# Patient Record
Sex: FEMALE | Race: WHITE
[De-identification: names, ages, dates, MRNs, and addresses within clinical notes are randomized per-mention and may not be internally consistent; named-entity substitution may affect disease eponyms.]

---

## 2017-01-24 ENCOUNTER — HOSPITAL ENCOUNTER (INPATIENT)
Dept: HOSPITAL 17 - H2EB | Age: 32
LOS: 9 days | Discharge: HOME | End: 2017-02-02
Attending: OBSTETRICS & GYNECOLOGY | Admitting: OBSTETRICS & GYNECOLOGY
Payer: MEDICAID

## 2017-01-24 VITALS — HEIGHT: 62 IN | BODY MASS INDEX: 37.73 KG/M2 | WEIGHT: 205 LBS

## 2017-01-24 DIAGNOSIS — Z3A.39: ICD-10-CM

## 2017-01-24 DIAGNOSIS — O34.219: Primary | ICD-10-CM

## 2017-01-24 DIAGNOSIS — D64.9: ICD-10-CM

## 2017-01-24 PROCEDURE — 81001 URINALYSIS AUTO W/SCOPE: CPT

## 2017-01-24 PROCEDURE — 85025 COMPLETE CBC W/AUTO DIFF WBC: CPT

## 2017-01-24 PROCEDURE — 59025 FETAL NON-STRESS TEST: CPT

## 2017-01-24 PROCEDURE — 86850 RBC ANTIBODY SCREEN: CPT

## 2017-01-24 PROCEDURE — 90715 TDAP VACCINE 7 YRS/> IM: CPT

## 2017-01-24 PROCEDURE — 86900 BLOOD TYPING SEROLOGIC ABO: CPT

## 2017-01-24 PROCEDURE — 87086 URINE CULTURE/COLONY COUNT: CPT

## 2017-01-24 PROCEDURE — 86901 BLOOD TYPING SEROLOGIC RH(D): CPT

## 2017-01-31 VITALS — SYSTOLIC BLOOD PRESSURE: 129 MMHG | DIASTOLIC BLOOD PRESSURE: 71 MMHG | HEART RATE: 93 BPM

## 2017-01-31 VITALS
DIASTOLIC BLOOD PRESSURE: 73 MMHG | OXYGEN SATURATION: 99 % | HEART RATE: 95 BPM | TEMPERATURE: 97.9 F | RESPIRATION RATE: 18 BRPM | SYSTOLIC BLOOD PRESSURE: 127 MMHG

## 2017-01-31 VITALS — SYSTOLIC BLOOD PRESSURE: 116 MMHG | DIASTOLIC BLOOD PRESSURE: 71 MMHG | HEART RATE: 100 BPM | OXYGEN SATURATION: 97 %

## 2017-01-31 VITALS
OXYGEN SATURATION: 98 % | HEART RATE: 109 BPM | TEMPERATURE: 97.7 F | DIASTOLIC BLOOD PRESSURE: 68 MMHG | RESPIRATION RATE: 17 BRPM | SYSTOLIC BLOOD PRESSURE: 127 MMHG

## 2017-01-31 VITALS
RESPIRATION RATE: 16 BRPM | SYSTOLIC BLOOD PRESSURE: 125 MMHG | DIASTOLIC BLOOD PRESSURE: 66 MMHG | HEART RATE: 85 BPM | TEMPERATURE: 98.1 F

## 2017-01-31 VITALS
RESPIRATION RATE: 18 BRPM | DIASTOLIC BLOOD PRESSURE: 67 MMHG | SYSTOLIC BLOOD PRESSURE: 124 MMHG | OXYGEN SATURATION: 96 % | HEART RATE: 100 BPM

## 2017-01-31 LAB
BACTERIA #/AREA URNS HPF: (no result) /HPF
BASOPHILS # BLD AUTO: 0 TH/MM3 (ref 0–0.2)
BASOPHILS NFR BLD: 0.3 % (ref 0–2)
COLOR UR: YELLOW
COMMENT (UR): (no result)
CULTURE IF INDICATED: (no result)
EOSINOPHIL # BLD: 0.1 TH/MM3 (ref 0–0.4)
EOSINOPHIL NFR BLD: 0.9 % (ref 0–4)
ERYTHROCYTE [DISTWIDTH] IN BLOOD BY AUTOMATED COUNT: 14.7 % (ref 11.6–17.2)
GLUCOSE UR STRIP-MCNC: (no result) MG/DL
HCT VFR BLD CALC: 37.1 % (ref 35–46)
HEMO FLAGS: (no result)
HGB UR QL STRIP: (no result)
HYALINE CASTS #/AREA URNS LPF: 1 /LPF
KETONES UR STRIP-MCNC: (no result) MG/DL
LYMPHOCYTES # BLD AUTO: 2.3 TH/MM3 (ref 1–4.8)
LYMPHOCYTES NFR BLD AUTO: 19.8 % (ref 9–44)
MCH RBC QN AUTO: 26.8 PG (ref 27–34)
MCHC RBC AUTO-ENTMCNC: 32.5 % (ref 32–36)
MCV RBC AUTO: 82.5 FL (ref 80–100)
MONOCYTES NFR BLD: 6.5 % (ref 0–8)
MUCOUS THREADS #/AREA URNS LPF: (no result) /LPF
NEUTROPHILS # BLD AUTO: 8.5 TH/MM3 (ref 1.8–7.7)
NEUTROPHILS NFR BLD AUTO: 72.5 % (ref 16–70)
NITRITE UR QL STRIP: (no result)
PLATELET # BLD: 284 TH/MM3 (ref 150–450)
RBC # BLD AUTO: 4.5 MIL/MM3 (ref 4–5.3)
SP GR UR STRIP: 1.02 (ref 1–1.03)
SQUAMOUS #/AREA URNS HPF: 4 /HPF (ref 0–5)
WBC # BLD AUTO: 11.7 TH/MM3 (ref 4–11)

## 2017-01-31 RX ADMIN — SODIUM CHLORIDE, PRESERVATIVE FREE SCH ML: 5 INJECTION INTRAVENOUS at 20:25

## 2017-01-31 NOTE — MH
cc:

AJ JUAREZ

****

 

DATE OF ADMISSION

2017

 

REASON FOR ADMISSION

1. Intrauterine pregnancy at 39 weeks.

2. History of  deliveries x 2

 

HISTORY OF PRESENT ILLNESS

Ms. Casillas is a 32-year-old female para 2-0-3-2 who is at 39 weeks plus today

and is being brought in for a repeat  section.  She is 39 weeks by

early ultrasound and dates and she has been followed in the office without any

significant problems.  She did have a history of oligohydramnios in previous

pregnancies and we had followed that.  She understands the risks and the

benefits of the procedure and has agreed to proceed.

 

PAST OB HISTORY

She is para 2-0-3-2.  She has had two  sections at term, two SABs and

one TAB.

 

PAST GYN HISTORY

Negative.

 

PAST SURGICAL HISTORY

She has had  sections x 2.

 

PAST MEDICAL HISTORY

History of oligohydramnios.

 

SOCIAL HISTORY

She is single.  She does not smoke, drink or take drugs.

 

FAMILY HISTORY

Remarkable for melanotic colon cancer, high cholesterol, high blood pressure,

depression, diabetes and Alzheimer's.

 

CURRENT MEDICATIONS

Prenatal vitamins.

 

ALLERGIES

No known drug allergies.

 

 

REVIEW OF SYSTEMS

No headaches, no shortness of breath, no chest pain or pressure.  The baby is

moving well.  She denies any rupture of membranes or bleeding.

 

PHYSICAL EXAMINATION

GENERAL:  A well-developed, well-nourished female in no acute distress, resting

comfortably.

VITAL SIGNS:  Blood pressure is 120/78.  Her weight is 205.

HEENT:  Normocephalic, atraumatic.

NECK:  Supple.  Trachea is in the midline.  No thyromegaly or adenopathy.

CHEST:  Clear to auscultation.

HEART:  Regular rate and rhythm, without murmur or gallop.

ABDOMEN:  Gravid, soft, nontender.  The fundus is nontender.  The baby feels to

be in vertex presentation.

PELVIC EXAM:  Deferred.

EXTREMITIES:  There is no clubbing or cyanosis.  There is a little bit of 1+

pitting edema in the lower extremities.

 

ASSESSMENT AND PLAN

1. Intrauterine pregnancy at 39+ weeks with a previous  section x 2.

     We will go ahead and repeat that.  We will go ahead and give her

     prophylactic antibiotics for that as well.

2. History of oligohydramnios.  We will follow this.

 

 

                               __________________________________

                           R. Aj Juarez MD

 

 

 

 

RJV/SSB

D:  2017/10:12 AM

T:  2017/10:31 AM

Visit #:  [id number]

Job #:  [id number]

## 2017-02-01 LAB
BASOPHILS # BLD AUTO: 0 TH/MM3 (ref 0–0.2)
BASOPHILS NFR BLD: 0.2 % (ref 0–2)
EOSINOPHIL # BLD: 0 TH/MM3 (ref 0–0.4)
EOSINOPHIL NFR BLD: 0.5 % (ref 0–4)
ERYTHROCYTE [DISTWIDTH] IN BLOOD BY AUTOMATED COUNT: 14.9 % (ref 11.6–17.2)
HCT VFR BLD CALC: 28.8 % (ref 35–46)
HEMO FLAGS: (no result)
LYMPHOCYTES # BLD AUTO: 1.3 TH/MM3 (ref 1–4.8)
LYMPHOCYTES NFR BLD AUTO: 13.7 % (ref 9–44)
MCH RBC QN AUTO: 27.4 PG (ref 27–34)
MCHC RBC AUTO-ENTMCNC: 33.2 % (ref 32–36)
MCV RBC AUTO: 82.7 FL (ref 80–100)
MONOCYTES NFR BLD: 7 % (ref 0–8)
NEUTROPHILS # BLD AUTO: 7.7 TH/MM3 (ref 1.8–7.7)
NEUTROPHILS NFR BLD AUTO: 78.6 % (ref 16–70)
PLATELET # BLD: 226 TH/MM3 (ref 150–450)
RBC # BLD AUTO: 3.48 MIL/MM3 (ref 4–5.3)
WBC # BLD AUTO: 9.8 TH/MM3 (ref 4–11)

## 2017-02-01 RX ADMIN — IBUPROFEN PRN MG: 600 TABLET, FILM COATED ORAL at 13:30

## 2017-02-01 RX ADMIN — OXYCODONE HYDROCHLORIDE AND ACETAMINOPHEN PRN TAB: 5; 325 TABLET ORAL at 19:24

## 2017-02-01 RX ADMIN — OXYCODONE HYDROCHLORIDE AND ACETAMINOPHEN PRN TAB: 5; 325 TABLET ORAL at 08:27

## 2017-02-01 RX ADMIN — STANDARDIZED SENNA CONCENTRATE AND DOCUSATE SODIUM PRN TAB: 8.6; 5 TABLET, FILM COATED ORAL at 13:30

## 2017-02-01 RX ADMIN — OXYCODONE HYDROCHLORIDE AND ACETAMINOPHEN PRN TAB: 5; 325 TABLET ORAL at 04:00

## 2017-02-01 RX ADMIN — IBUPROFEN PRN MG: 600 TABLET, FILM COATED ORAL at 19:25

## 2017-02-01 RX ADMIN — OXYCODONE HYDROCHLORIDE AND ACETAMINOPHEN PRN TAB: 5; 325 TABLET ORAL at 13:30

## 2017-02-01 NOTE — HHI.OB
Subjective


Post Operative Day:  1





Objective


Vitals/I&O





Vital Signs








  Date Time  Temp Pulse Resp B/P Pulse Ox O2 Delivery O2 Flow Rate FiO2


 


17 14:50  95  127/73    


 


17 14:34    124/67    


 


17 14:34  100 18  96   


 


17 14:25  100  116/71 97   


 


17 13:55 97.7 109 17 127/68 98   








Result Diagram:  


17 0519





Objective Remarks


GENERAL: Well-nourished, well-developed patient.


CARDIOVASCULAR: Regular rate and rhythm without murmurs, gallops, or rubs. 


RESPIRATORY: Breath sounds equal bilaterally. No accessory muscle use.


ABDOMEN/GI: Abdomen soft, non-tender, bowel sounds present. 


   Incision: dressing, Clean, dry and intact.


   Fundus: Firm, non-tender at umbilicus.


GENITOURINARY: Light to moderate bleeding.


EXTREMITIES: No cyanosis or edema, non-tender, without signs of DVT.


Medications and IVs





 Current Medications








 Medications


  (Trade)  Dose


 Ordered  Sig/Dariana


 Route  Start Time


 Stop Time Status Last Admin


 


  (Lr 1000 ml Inj)  1,000 ml @ 


 100 mls/hr  Q10H


 IV  17 22:04


 17 18:03  17 20:27


 


 


  (NS Flush)  2 ml  BID


 IV  17 21:00


     


 


 


  (NS Flush)  2 ml  UNSCH  PRN


 IV  17 17:15


     


 


 


  (Mylicon Chew)  80 mg  QID  PRN


 PO  17 17:15


     


 


 


  (Percocet  5-325


 Mg)  1 tab  Q4H  PRN


 PO  17 17:15


    17 08:27


 


 


  (Percocet  5-325


 Mg)  2 tab  Q4H  PRN


 PO  17 17:15


     


 


 


  (Angeles-Colace)  2 tab  Q12H  PRN


 PO  17 17:15


     


 


 


  (Ambien)  5 mg  HS  PRN


 PO  17 17:15


     


 


 


  (M-M-R Ii Inj)  0.5 ml  ONCE ONCE


 SQ  17 16:00


 17 16:01   


 


 


  (Boostrix Inj)  0.5 ml  ONCE ONCE


 IM  17 16:00


 17 16:01   


 


 


  (Zofran Inj)  4 mg  Q6H  PRN


 IV PUSH  17 17:15


    17 17:46


 











Assessment/Plan


Problem List:  


(1) Anemia


Plan:  treat pp





(2) S/P repeat low transverse 


Plan:  routine





Assessment and Plan


POD #1


pt doing well


will shower today and remove dressing


added motrin to oral pain meds


routine post op care


Discharge Planning


consider dc in 1-2 days








Silvana Mendez 2017 10:20

## 2017-02-01 NOTE — HHI.DCPOC
Discharge Care Plan


Diagnosis:  


(1) S/P repeat low transverse 


(2) Anemia


Your Health Problems Are:      delivery


Report Symptoms to Your Doctor


-Temperate above 100.5 degrees


-Redness, of incision or excessive or foul smelling drainage


-Unusual pain or calf pain


-Increased vaginal bleeding


-Painful or difficulty urinating


-Feelings of extreme sadness or anxiety after 2 weeks


Goals to Promote Your Health


* To prevent worsening of your condition and complications


* To maintain your health at the optimal level


Directions to Meet Your Goals


*** Take your medications as prescribed


*** Follow your dietary instruction


*** Follow activity as directed


*** Ensure plenty of rest for recovery


*** Drink fluids for hydration








*** Keep your appointments as scheduled


*** Take your immunizations and boosters as scheduled


*** If your symptoms worsen call your PCP, if no PCP go to Urgent Care Center 

or Emergency Room***


*** Smoking is Dangerous to Your Health. Avoid second hand smoke***


***Call the 24-hour crisis hotline for domestic abuse at 1-378.348.7981***








Silvana Mendez 2017 10:21

## 2017-02-02 RX ADMIN — IBUPROFEN PRN MG: 600 TABLET, FILM COATED ORAL at 04:05

## 2017-02-02 RX ADMIN — OXYCODONE HYDROCHLORIDE AND ACETAMINOPHEN PRN TAB: 5; 325 TABLET ORAL at 08:54

## 2017-02-02 RX ADMIN — SODIUM CHLORIDE, PRESERVATIVE FREE SCH ML: 5 INJECTION INTRAVENOUS at 08:56

## 2017-02-02 RX ADMIN — OXYCODONE HYDROCHLORIDE AND ACETAMINOPHEN PRN TAB: 5; 325 TABLET ORAL at 04:05

## 2017-02-02 RX ADMIN — STANDARDIZED SENNA CONCENTRATE AND DOCUSATE SODIUM PRN TAB: 8.6; 5 TABLET, FILM COATED ORAL at 04:05

## 2017-02-02 RX ADMIN — IBUPROFEN PRN MG: 600 TABLET, FILM COATED ORAL at 13:32

## 2017-02-02 NOTE — HHI.DS
Admission Date


2017 at 09:53


Discharge Date:  2017


Admitting Diagnosis


39 weeks


previous c section x 2


Diagnosis:  


(1) Anemia


Diagnosis:  Secondary





(2) S/P repeat low transverse 


Diagnosis:  Principal





Delivery Date:  2017


:  Repeat


 Reason:  


previous c section x 2


Brief History


39 week pregnancy


previous c setion


repeat c section


Hospital Course


routine


Pt Condition on Discharge:  Good


Discharge Disposition:  Discharge Home


Discharge Instructions


Diet Instructions:  As Tolerated, No Restrictions


Activities You Can Perform:  Shower Only-No Bath, Pelvic Rest


Activities to Avoid:  Strenuous Activity, Sexual Activity


Follow up Referrals:  


OB/GYN - 1 Week





New Medications:  


Ibuprofen (Ibuprofen) 600 Mg Tab


600 MG PO Q6H PRN pain #30 TAB


Oxycodone-Acetaminophen (Oxycodone-Acetaminophen) 5-325 mg Tab


1 TAB PO Q4H PRN moderate pain #30 TAB


 


Continued Medications:  


Prenatal Vit W/ Ferrous Fumara (Prenatal)  Tab


1 PO











Silvana Mendez 2017 14:26

## 2017-02-03 NOTE — MP
cc:

VITOR JUAREZ MD

****

 

 

DATE OF SURGERY:

2017

 

PREOPERATIVE DIAGNOSIS:

Intrauterine pregnancy at 39 weeks.

Previous  section times two for repeat.

 

POSTOPERATIVE DIAGNOSIS:

Intrauterine pregnancy at 39 weeks.

Previous  section times two for repeat.

 

OPERATION:

Repeat low transverse  section.

 

ANESTHESIA:

Spinal.

 

SURGEON:

VITOR Juarez MD.

 

FINDINGS:

Normal male infant with a weight of 8 lbs, 9 ounces, Apgar's 8 and 9, normal

tubes, normal ovaries, normal cul-de-sacs.

 

COUNTS:

Correct.

 

ESTIMATED BLOOD LOSS

600 cc.

 

FLUIDS/CRYSTALLOIDS:

The patient tolerated the procedure well and went to the Recovery Room in good

condition. The placenta could not be donated because of the  traveling

to the [place] infested area around [place].

 

CONDITION:

The patient tolerated the procedure well and went to the recovery room in

satisfactory condition.

 

PROCEDURE:

The patient was taken to the operating room identified by name band and

verbally.  She was given a spinal anesthetic.  A Lyons catheter was inserted.

She was prepped and draped for  section.

 

The old Pfannenstiel incision was removed and excised completely and the

incision was taken down to the fascia.  The fascia was taken off the rectus

muscle by blunt and sharp dissection.  The peritoneum was entered under direct

vision without complication.

The incision was extended with care to avoid the urinary bladder. A  bladder

blade was placed and a bladder flap created over the lower uterine segment

which was well-developed.  The uterus was then scored in a transverse manner

along the lower uterine segment and taken down in the midline until the uterine

cavity was entered.  The incision was extended with the surgeon's fingers.

 

The fetal vertex was grasped and delivered through the incision without

difficulty.  The hypopharynx and nasopharynx were suctioned.  The remainder of

the infant was delivered.  The cord was doubly clamped and cut and the infant

handed to the  resuscitation team that was present.  Cord blood was

obtained.  The placenta was delivered manually without difficulty.

 

The uterus was curettaged twice with a wet lap.  The uterine incision was

repaired with 2-0 Vicryl a running locking fashion, the second layer

imbricating the first.  The cul-de-sac and gutters were cleaned of blood and

debris with a large amount of irrigation.  The uterus was delivered back into

the abdomen.

 

The incision was again inspected and was hemostatic.  The rectus muscles were

reapproximated with 0 Vicryl in an interrupted fashion.  The fascia was

repaired with 0 Vicryl from lateral to midline bilaterally in a running

fashion.  The subcutaneous tissue was repaired with 3-0 Vicryl.  The skin was

repaired with 4-0 Monocryl in a subcuticular manner.  Steri-Strips were

applied.  The wound was sterilely dressed.

 

She tolerated the procedure well and went to the recovery room in good

condition.

 

                              _________________________________

                              R. MD ASTER Drummond/sandip

D:  2017/1:52 PM

T:  2/3/2017/3:32 PM

Visit #:  [id number]

Job #:  [id number]

## 2018-04-19 ENCOUNTER — HOSPITAL ENCOUNTER (OUTPATIENT)
Dept: HOSPITAL 17 - CPRE | Age: 33
End: 2018-04-19
Attending: OBSTETRICS & GYNECOLOGY
Payer: COMMERCIAL

## 2018-04-19 DIAGNOSIS — Z01.812: Primary | ICD-10-CM

## 2018-04-19 LAB
ERYTHROCYTE [DISTWIDTH] IN BLOOD BY AUTOMATED COUNT: 13.5 % (ref 11.6–17.2)
HCT VFR BLD CALC: 41.9 % (ref 35–46)
HGB BLD-MCNC: 13.6 GM/DL (ref 11.6–15.3)
MCH RBC QN AUTO: 27.9 PG (ref 27–34)
MCHC RBC AUTO-ENTMCNC: 32.4 % (ref 32–36)
MCV RBC AUTO: 85.9 FL (ref 80–100)
PLATELET # BLD: 435 TH/MM3 (ref 150–450)
PMV BLD AUTO: 8 FL (ref 7–11)
RBC # BLD AUTO: 4.88 MIL/MM3 (ref 4–5.3)
WBC # BLD AUTO: 12.5 TH/MM3 (ref 4–11)

## 2018-04-19 PROCEDURE — 85027 COMPLETE CBC AUTOMATED: CPT

## 2018-04-19 PROCEDURE — 84703 CHORIONIC GONADOTROPIN ASSAY: CPT

## 2018-04-19 PROCEDURE — 36415 COLL VENOUS BLD VENIPUNCTURE: CPT

## 2018-04-25 ENCOUNTER — HOSPITAL ENCOUNTER (OUTPATIENT)
Dept: HOSPITAL 17 - HSDC | Age: 33
Discharge: HOME | End: 2018-04-25
Attending: OBSTETRICS & GYNECOLOGY
Payer: MEDICAID

## 2018-04-25 VITALS
OXYGEN SATURATION: 100 % | SYSTOLIC BLOOD PRESSURE: 113 MMHG | HEART RATE: 74 BPM | RESPIRATION RATE: 18 BRPM | TEMPERATURE: 98.4 F | DIASTOLIC BLOOD PRESSURE: 69 MMHG

## 2018-04-25 VITALS — WEIGHT: 164.02 LBS | BODY MASS INDEX: 30.18 KG/M2 | HEIGHT: 62 IN

## 2018-04-25 DIAGNOSIS — Z30.2: Primary | ICD-10-CM

## 2018-04-25 DIAGNOSIS — N83.02: ICD-10-CM

## 2018-04-25 DIAGNOSIS — N73.6: ICD-10-CM

## 2018-04-25 PROCEDURE — 00840 ANES IPER PX LOWER ABD NOS: CPT

## 2018-04-25 PROCEDURE — 58615 OCCLUDE FALLOPIAN TUBE(S): CPT

## 2018-04-25 PROCEDURE — 49322 LAPAROSCOPY ASPIRATION: CPT

## 2018-04-25 NOTE — MP
cc:

VITOR Juarez MD

****

 

 

DATE OF OPERATION:

04/25/2018

 

DATE OF OPERATION:

04/25/2018

 

PREOPERATIVE DIAGNOSIS:

Desires permanent sterilization.

 

POSTOPERATIVE DIAGNOSES:

1.  Desires permanent sterilization.

2.  Pelvic adhesions.

3.  Right ovarian cyst.

 

PROCEDURE PERFORMED:

Examination under anesthesia, laparoscopic exam with bilateral tubal 

ligation, aspiration of right ovarian cyst and lysis of adhesions.

 

ANESTHESIA:

General endotracheal intubation.

 

SURGEON:

KEITH Kidd

 

FINDINGS:

On examination under anesthesia, vagina was clean.  The cervix was 

clean.  The uterus normal size, shape and consistency and mobile.  The

adnexa were negative for masses.  Laparoscopic exam revealed normal 

uterus, normal fallopian tubes, normal in length and caliber.  Normal 

left ovary.  The right ovary had a 2.4 x 2.5 simple follicular cyst.  

There was scar tissue of the omentum to the anterior abdominal wall in

the left lower quadrant and also in the right periumbilical area.

 

COMPLICATIONS:

We were unable to remove the tubes, because we were unable to put the 

third port because of the adhesions.  The counts were correct.  

Estimated blood loss was minimal.  The patient tolerated the procedure

well, went to the recovery room in good condition.

 

DESCRIPTION OF PROCEDURE:

The patient was taken to the operating room, identified by name band 

and verbally, given a general anesthetic, prepped and draped in the 

dorsal lithotomy position for laparoscopic surgery.  Her bladder was 

drained with an in and out catheter and the timeout was taken.  An 

examination under anesthesia with the above findings was done.  A 

speculum was placed in the vagina.  The anterior lip of the cervix was

grasped with a single-tooth tenaculum.  The cervix was then cannulated

with the Hulka clamp, and attention was turned to the subumbilical 

area.  A small subumbilical incision was made and using the 5 mm 

trocar, we entered the abdomen under direct vision without difficulty.

 There were some adhesions around that area.  There were adhesions to 

the left lower quadrant and the right periumbilical area.  Once a 

pneumoperitoneum was created and we could clearly see the adhesive 

disease, we decided to put 1 trocar in the right lower quadrant below 

the adhesive disease.  This was done under direct vision without 

difficulty.  On the left side, we were going to place a port; however,

we did not think it was safe because of the adhesions in this area, 

which at the end of the case, we were able to take down.  The first 

order of business was to tie the fallopian tubes.  We cauterized in 3 

portions in the midportion with the Harmonic scalpel and came behind 

that with a Kleppinger forceps in the midportion of each tube.  Once 

this had been accomplished, we attempted to take the adhesions down on

the left lower quadrant and we were successful in doing that with the 

Harmonic scalpel.  There was a small piece of endometriosis, possibly 

on the left ovary.  We attempted to biopsy this several times but with

just 2 ports, we were unable to do that and we elected just to 

cauterize that.  The right ovary had a 2 x 3 cm ovarian cyst and using

the Harmonic scalpel as a drill, we drilled into it and we saw clear 

fluid.  It looked like a simple follicular cyst.  Bleeding was minimal

with this as well.  We irrigated the pelvis with some irrigation and 

removed the laparoscope under direct vision and the air was released 

through the second puncture.  She tolerated the procedure well.  We 

fixed the incisions with 4-0 Monocryl in a subcuticular manner and she

was taken to the recovery room in satisfactory condition.

 

 

__________________________________

R. MD ASTER Drummond/KOBY

D: 04/25/2018, 02:50 PM

T: 04/25/2018, 03:17 PM

Visit #: [id number]

Job #: [id number]

## 2023-09-19 NOTE — HHI.OB
Subjective


Post Operative Day:  2





Objective


Result Diagram:  


17 0519





Objective Remarks


GENERAL: Well-nourished, well-developed patient.


CARDIOVASCULAR: Regular rate and rhythm without murmurs, gallops, or rubs. 


RESPIRATORY: Breath sounds equal bilaterally. No accessory muscle use.


ABDOMEN/GI: Abdomen soft, non-tender, bowel sounds present. 


   Incision: , Clean, dry and intact.


   Fundus: Firm, non-tender at umbilicus.


GENITOURINARY: Light to moderate bleeding.


EXTREMITIES: No cyanosis or edema, non-tender, without signs of DVT.


Medications and IVs





 Current Medications








 Medications


  (Trade)  Dose


 Ordered  Sig/Dariana


 Route  Start Time


 Stop Time Status Last Admin


 


  (NS Flush)  2 ml  BID


 IV  17 21:00


     


 


 


  (NS Flush)  2 ml  UNSCH  PRN


 IV  17 17:15


     


 


 


  (Mylicon Chew)  80 mg  QID  PRN


 PO  17 17:15


     


 


 


  (Percocet  5-325


 Mg)  1 tab  Q4H  PRN


 PO  17 17:15


    17 08:27


 


 


  (Percocet  5-325


 Mg)  2 tab  Q4H  PRN


 PO  17 17:15


    17 08:54


 


 


  (Angeles-Colace)  2 tab  Q12H  PRN


 PO  17 17:15


    17 04:05


 


 


  (Ambien)  5 mg  HS  PRN


 PO  17 17:15


     


 


 


  (Zofran Inj)  4 mg  Q6H  PRN


 IV PUSH  17 17:15


    17 17:46


 


 


  (Motrin)  600 mg  Q6H  PRN


 PO  17 10:15


    17 04:05


 











Assessment/Plan


Problem List:  


(1) Anemia


Plan:  treat pp





(2) S/P repeat low transverse 


Plan:  routine





Assessment and Plan


POD #2


pt doing well


denies dizziness, sob with ambulation 


pain well managed with oral pain meds


routine care


Discharge Planning


consider dc in 1-2 days








Silvana Mendez 2017 12:31 Improved